# Patient Record
Sex: MALE | Race: WHITE | Employment: FULL TIME | URBAN - METROPOLITAN AREA
[De-identification: names, ages, dates, MRNs, and addresses within clinical notes are randomized per-mention and may not be internally consistent; named-entity substitution may affect disease eponyms.]

---

## 2017-07-26 PROCEDURE — 82570 ASSAY OF URINE CREATININE: CPT | Performed by: INTERNAL MEDICINE

## 2017-07-26 PROCEDURE — 82043 UR ALBUMIN QUANTITATIVE: CPT | Performed by: INTERNAL MEDICINE

## 2018-09-20 PROCEDURE — 82043 UR ALBUMIN QUANTITATIVE: CPT | Performed by: INTERNAL MEDICINE

## 2018-09-20 PROCEDURE — 82570 ASSAY OF URINE CREATININE: CPT | Performed by: INTERNAL MEDICINE

## 2019-06-29 ENCOUNTER — HOSPITAL ENCOUNTER (EMERGENCY)
Facility: HOSPITAL | Age: 49
Discharge: HOME | End: 2019-06-29
Attending: EMERGENCY MEDICINE
Payer: COMMERCIAL

## 2019-06-29 VITALS
TEMPERATURE: 99 F | OXYGEN SATURATION: 98 % | HEART RATE: 66 BPM | WEIGHT: 197 LBS | DIASTOLIC BLOOD PRESSURE: 98 MMHG | BODY MASS INDEX: 26.68 KG/M2 | SYSTOLIC BLOOD PRESSURE: 146 MMHG | HEIGHT: 72 IN | RESPIRATION RATE: 15 BRPM

## 2019-06-29 DIAGNOSIS — R42 LIGHTHEADED: Primary | ICD-10-CM

## 2019-06-29 LAB
ALBUMIN SERPL-MCNC: 4.2 G/DL (ref 3.4–5)
ALBUMIN/GLOB SERPL: 1.2 {RATIO} (ref 1–2)
ALP LIVER SERPL-CCNC: 115 U/L (ref 45–117)
ALT SERPL-CCNC: 29 U/L (ref 16–61)
ANION GAP SERPL CALC-SCNC: 10 MMOL/L (ref 0–18)
AST SERPL-CCNC: 17 U/L (ref 15–37)
BASOPHILS # BLD AUTO: 0.07 X10(3) UL (ref 0–0.2)
BASOPHILS NFR BLD AUTO: 0.9 %
BILIRUB SERPL-MCNC: 0.9 MG/DL (ref 0.1–2)
BUN BLD-MCNC: 10 MG/DL (ref 7–18)
BUN/CREAT SERPL: 11.8 (ref 10–20)
CALCIUM BLD-MCNC: 9.6 MG/DL (ref 8.5–10.1)
CHLORIDE SERPL-SCNC: 104 MMOL/L (ref 98–112)
CO2 SERPL-SCNC: 24 MMOL/L (ref 21–32)
CREAT BLD-MCNC: 0.85 MG/DL (ref 0.7–1.3)
DEPRECATED RDW RBC AUTO: 38.7 FL (ref 35.1–46.3)
EOSINOPHIL # BLD AUTO: 0.22 X10(3) UL (ref 0–0.7)
EOSINOPHIL NFR BLD AUTO: 2.9 %
ERYTHROCYTE [DISTWIDTH] IN BLOOD BY AUTOMATED COUNT: 11.6 % (ref 11–15)
GLOBULIN PLAS-MCNC: 3.4 G/DL (ref 2.8–4.4)
GLUCOSE BLD-MCNC: 172 MG/DL (ref 70–99)
HCT VFR BLD AUTO: 43.9 % (ref 39–53)
HGB BLD-MCNC: 15.6 G/DL (ref 13–17.5)
IMM GRANULOCYTES # BLD AUTO: 0.01 X10(3) UL (ref 0–1)
IMM GRANULOCYTES NFR BLD: 0.1 %
LYMPHOCYTES # BLD AUTO: 1.56 X10(3) UL (ref 1–4)
LYMPHOCYTES NFR BLD AUTO: 20.2 %
M PROTEIN MFR SERPL ELPH: 7.6 G/DL (ref 6.4–8.2)
MCH RBC QN AUTO: 32.5 PG (ref 26–34)
MCHC RBC AUTO-ENTMCNC: 35.5 G/DL (ref 31–37)
MCV RBC AUTO: 91.5 FL (ref 80–100)
MONOCYTES # BLD AUTO: 0.81 X10(3) UL (ref 0.1–1)
MONOCYTES NFR BLD AUTO: 10.5 %
NEUTROPHILS # BLD AUTO: 5.04 X10 (3) UL (ref 1.5–7.7)
NEUTROPHILS # BLD AUTO: 5.04 X10(3) UL (ref 1.5–7.7)
NEUTROPHILS NFR BLD AUTO: 65.4 %
OSMOLALITY SERPL CALC.SUM OF ELEC: 289 MOSM/KG (ref 275–295)
PLATELET # BLD AUTO: 251 10(3)UL (ref 150–450)
POTASSIUM SERPL-SCNC: 3.6 MMOL/L (ref 3.5–5.1)
RBC # BLD AUTO: 4.8 X10(6)UL (ref 4.3–5.7)
SODIUM SERPL-SCNC: 138 MMOL/L (ref 136–145)
TROPONIN I SERPL-MCNC: <0.045 NG/ML (ref ?–0.04)
WBC # BLD AUTO: 7.7 X10(3) UL (ref 4–11)

## 2019-06-29 PROCEDURE — 85025 COMPLETE CBC W/AUTO DIFF WBC: CPT | Performed by: EMERGENCY MEDICINE

## 2019-06-29 PROCEDURE — 93010 ELECTROCARDIOGRAM REPORT: CPT

## 2019-06-29 PROCEDURE — 99284 EMERGENCY DEPT VISIT MOD MDM: CPT

## 2019-06-29 PROCEDURE — 80053 COMPREHEN METABOLIC PANEL: CPT | Performed by: EMERGENCY MEDICINE

## 2019-06-29 PROCEDURE — 84484 ASSAY OF TROPONIN QUANT: CPT | Performed by: EMERGENCY MEDICINE

## 2019-06-29 PROCEDURE — 96360 HYDRATION IV INFUSION INIT: CPT

## 2019-06-29 PROCEDURE — 93005 ELECTROCARDIOGRAM TRACING: CPT

## 2019-06-29 NOTE — ED INITIAL ASSESSMENT (HPI)
C/o lightheadedness, clamminess intermittently over the last week,   A little lightheaded now.    Denies n/v/ or pain

## 2019-06-30 LAB
ATRIAL RATE: 100 BPM
P AXIS: 59 DEGREES
P-R INTERVAL: 136 MS
Q-T INTERVAL: 352 MS
QRS DURATION: 92 MS
QTC CALCULATION (BEZET): 454 MS
R AXIS: 79 DEGREES
T AXIS: -12 DEGREES
VENTRICULAR RATE: 100 BPM

## 2019-06-30 NOTE — ED NOTES
Bedside report received from [facility]. Pt resting, family at bedside. Pt appears anxious, \"my sugar is probably high because I'm freaking out. \" Pt has a CGM, states, \"reading 181 right now and climbing. \" Support provided. Will monitor.  Pt given iced w

## 2019-06-30 NOTE — ED PROVIDER NOTES
Patient Seen in: [facility] Emergency Department    History   Patient presents with:  Dizziness (neurologic)    Stated Complaint: dizzy, diaphoretic x 1 week , has insulin pump     HPI    28-year-old male presents reporting episodes of lightheadedne 100 %   O2 Device        Current:BP (!) 169/102   Pulse 75   Temp 98.7 °F (37.1 °C) (Temporal)   Resp 12   Ht 182.9 cm (6')   Wt 89.4 kg   SpO2 100%   BMI 26.72 kg/m²         Physical Exam    General:  Vitals as listed.   No acute distress   HEENT: Sclerae could be related to stress but I feel we need to rule out other etiologies. Current work-up is unremarkable and I agree that the next step is a stress test.  He states he will then be following up with his primary care doctor.   I have instructed him to re

## 2025-01-17 ENCOUNTER — EKG ENCOUNTER (OUTPATIENT)
Dept: LAB | Age: 55
End: 2025-01-17
Attending: SURGERY
Payer: COMMERCIAL

## 2025-01-17 DIAGNOSIS — Z01.818 PREOP EXAMINATION: Primary | ICD-10-CM

## 2025-01-17 LAB
ATRIAL RATE: 74 BPM
P AXIS: 63 DEGREES
P-R INTERVAL: 132 MS
Q-T INTERVAL: 404 MS
QRS DURATION: 98 MS
QTC CALCULATION (BEZET): 448 MS
R AXIS: 69 DEGREES
T AXIS: 1 DEGREES
VENTRICULAR RATE: 74 BPM

## 2025-01-17 PROCEDURE — 93010 ELECTROCARDIOGRAM REPORT: CPT | Performed by: INTERNAL MEDICINE

## 2025-01-17 PROCEDURE — 93005 ELECTROCARDIOGRAM TRACING: CPT

## (undated) NOTE — ED AVS SNAPSHOT
Jr   MRN: [id number]    Department:  [facility] Emergency Department   Date of Visit:  6/29/2019           Disclosure     Insurance plans vary and the physician(s) referred by the ER may not be covered by your plan.  Please contact you tell this physician (or your personal doctor if your instructions are to return to your personal doctor) about any new or lasting problems. The primary care or specialist physician will see patients referred from the [facility] Emergency Department.  Shereen Garrido